# Patient Record
Sex: MALE | Race: WHITE
[De-identification: names, ages, dates, MRNs, and addresses within clinical notes are randomized per-mention and may not be internally consistent; named-entity substitution may affect disease eponyms.]

---

## 2017-01-19 NOTE — EMERGENCY ROOM VISIT NOTE
History


First contact with patient:  19:23


Chief Complaint:  CHEST PAIN


Stated Complaint:  CHEST PAIN


Nursing Triage Summary:  


patient brought in by ems 





patient reports chest pain into left arm at rest started less than 2 hours ago





patient has hx of SVT in august 2016 with ablation





History of Present Illness


The patient is a 32 year old male who presents to the Emergency Room via ALS 

with complaints of left-sided chest pain with radiation into the left arm.  The 

patient reports that the pain started suddenly prior to arrival when he was 

playing with his nieces.  He states the pain is located in the left side of the 

chest and radiates into the left arm.  It was very severe at onset, but has 

slightly improved.  He reports he has had some palpitations throughout the day.

  He has a history of SVT and had an ablation done at [facility] 

earlier this year.  He reports these are similar symptoms as to when he had the 

first episode of SVT.  He follows up with Dr. Gonzalez locally.  He denies any 

shortness of breath, nausea, vomiting, jaw pain or abdominal pain.





Review of Systems


A complete 10-point Review of Systems was discussed with the patient, with 

pertinent positives and negatives listed in the History of Present Illness. All 

remaining Review of Systems questions can be considered negative unless 

otherwise specified.





Past Medical/Surgical History


Medical Problems:


(1) ADHD (attention deficit hyperactivity disorder)


(2) Chest pain


(3) GERD (gastroesophageal reflux disease)


(4) Hx of supraventricular tachycardia


Surgical Problems:


(1) History of esophagogastroduodenoscopy (EGD)


(2) S/P ablation operation for arrhythmia








Family History





Cancer


Diabetes mellitus


Heart disease


Kidney disease


Kidney stones





Social History


Smoking Status:  Former Smoker


Drug Use:  none


Marital Status:  single


Housing Status:  lives alone


Occupation Status:  employed





Current/Historical Medications


Scheduled


Metoprolol Succinate (Toprol Xl), 50 MG PO DAILY





Allergies


Coded Allergies:  


     Iodinated Diagnostic Agents (Verified  Allergy, Unknown, RASH, 8/27/16)





Physical Exam


Vital Signs











  Date Time  Temp Pulse Resp B/P Pulse Ox O2 Delivery O2 Flow Rate FiO2


 


1/19/17 23:23  91 18 145/103 97   


 


1/19/17 23:06  98      


 


1/19/17 21:12  82 18 128/96 97 Room Air  


 


1/19/17 19:15  90      


 


1/19/17 19:11 36.3 93 20 135/90 93 Room Air  


 


1/19/17 19:11     93 Room Air  











Physical Exam


VITALS: Vitals are noted on the nurse's note and reviewed by myself.  Vital 

signs stable.


GENERAL: This is a 32-year-old male, in no acute distress, nondiaphoretic, well-

developed well-nourished.


SKIN: Capillary reflex less than 2 seconds.


HEART: Regular rate and rhythm without murmurs gallops or rubs.


LUNGS: Clear to auscultation bilaterally without wheezes, rales or rhonchi.  No 

retractions or accessory muscle use.


CHEST: Minimal reproducible pain to palpation of the sternal area.


NEURO: Patient was alert and oriented to person place and time.





Medical Decision & Procedures


ER Provider


Diagnostic Interpretation:


CHEST ONE VIEW PORTABLE





CLINICAL HISTORY: Chest pain.    





COMPARISON STUDY:  Chest radiograph September 26, 2016 per





FINDINGS: Lung volumes are at the lower limits of normal. There is no


pneumothorax or pleural effusion. Cardiomediastinal silhouette is stable. There


is no evidence of pulmonary edema. No consolidation is identified. Cardiac size


is at the upper limits of normal. 





IMPRESSION:  No acute cardiopulmonary findings.





Laboratory Results


1/19/17 19:34








Red Blood Count 5.01, Mean Corpuscular Volume 84.2, Mean Corpuscular Hemoglobin 

30.7, Mean Corpuscular Hemoglobin Concent 36.5, Mean Platelet Volume 10.6, 

Neutrophils (%) (Auto) 65.7, Lymphocytes (%) (Auto) 20.9, Monocytes (%) (Auto) 

12.2, Eosinophils (%) (Auto) 0.7, Basophils (%) (Auto) 0.4, Neutrophils # (Auto

) 5.35, Lymphocytes # (Auto) 1.70, Monocytes # (Auto) 0.99, Eosinophils # (Auto

) 0.06, Basophils # (Auto) 0.03





1/19/17 19:34

















Test


  1/19/17


19:34 1/19/17


22:48


 


White Blood Count


  8.14 K/uL


(4.8-10.8) 


 


 


Red Blood Count


  5.01 M/uL


(4.7-6.1) 


 


 


Hemoglobin


  15.4 g/dL


(14.0-18.0) 


 


 


Hematocrit 42.2 % (42-52)  


 


Mean Corpuscular Volume


  84.2 fL


() 


 


 


Mean Corpuscular Hemoglobin


  30.7 pg


(25-34) 


 


 


Mean Corpuscular Hemoglobin


Concent 36.5 g/dl


(32-36) 


 


 


Platelet Count


  211 K/uL


(130-400) 


 


 


Mean Platelet Volume


  10.6 fL


(7.4-10.4) 


 


 


Neutrophils (%) (Auto) 65.7 %  


 


Lymphocytes (%) (Auto) 20.9 %  


 


Monocytes (%) (Auto) 12.2 %  


 


Eosinophils (%) (Auto) 0.7 %  


 


Basophils (%) (Auto) 0.4 %  


 


Neutrophils # (Auto)


  5.35 K/uL


(1.4-6.5) 


 


 


Lymphocytes # (Auto)


  1.70 K/uL


(1.2-3.4) 


 


 


Monocytes # (Auto)


  0.99 K/uL


(0.11-0.59) 


 


 


Eosinophils # (Auto)


  0.06 K/uL


(0-0.5) 


 


 


Basophils # (Auto)


  0.03 K/uL


(0-0.2) 


 


 


RDW Standard Deviation


  37.3 fL


(36.4-46.3) 


 


 


RDW Coefficient of Variation


  12.3 %


(11.5-14.5) 


 


 


Immature Granulocyte % (Auto) 0.1 %  


 


Immature Granulocyte # (Auto)


  0.01 K/uL


(0.00-0.02) 


 


 


Anion Gap


  10.0 mmol/L


(3-11) 


 


 


Est Creatinine Clear Calc


Drug Dose 112.6 ml/min 


  


 


 


Estimated GFR (


American) 92.8 


  


 


 


Estimated GFR (Non-


American 80.1 


  


 


 


BUN/Creatinine Ratio 10.9 (10-20)  


 


Calcium Level


  9.2 mg/dl


(8.5-10.1) 


 


 


Total Bilirubin


  0.3 mg/dl


(0.2-1) 


 


 


Aspartate Amino Transf


(AST/SGOT) 23 U/L (15-37) 


  


 


 


Alanine Aminotransferase


(ALT/SGPT) 30 U/L (12-78) 


  


 


 


Alkaline Phosphatase


  129 U/L


() 


 


 


Total Creatine Kinase


  74 U/L


() 


 


 


Creatine Kinase MB


  < 0.5 ng/ml


(0.5-3.6) 


 


 


Creatine Kinase MB Ratio  (0-3.0)  


 


Total Protein


  7.0 gm/dl


(6.4-8.2) 


 


 


Albumin


  3.7 gm/dl


(3.4-5.0) 


 


 


Globulin


  3.3 gm/dl


(2.5-4.0) 


 


 


Albumin/Globulin Ratio 1.1 (0.9-2)  


 


Bedside Troponin I


  


  0.030 ng/ml


(0-0.045)











ECG


Rate (beats per minute):  84


Rhythm:  normal sinus


Findings:  no acute ischemic change, no ectopy





Medical Decision


Differential diagnosis includes acute coronary syndrome, pulmonary embolism, 

pneumothorax, pericarditis, myocarditis, endocarditis, anxiety, musculoskeletal 

pain, GERD, costochondritis, among others.





The patient was evaluated as above.  Labs were drawn and IV access was 

obtained.  Imaging studies were performed and read by radiology as above. The 

patient was reassessed multiple times during their stay in the emergency 

department and remained in stable condition.





The patient is a 32-year-old male who presents today complaining of chest pain 

with radiation into the left arm.  Previous records were reviewed.  Labs 

revealed no leukocytosis, anemia or concerning electrolyte abnormalities.  

Initial troponin was 0.020.  90 minute repeat troponin was performed and was 

found to be 0.030.  Troponin was again repeated 90 minutes after this and was 

unchanged.  EKG was not suggestive of any acute ischemic changes.  The patient 

was reevaluated and his symptoms had completely resolved.  Additionally, the 

patient did have some reproducible pain on examination.  I discussed the case 

with the patient's cardiologist, Dr. Gonzalez, who felt that if the patient was 

asymptomatic at this time, he was safe to be discharged home to follow-up as an 

outpatient.  The patient is a stress test one month ago which was negative.  

The patient is aware that if his symptoms return or worsen he should return to 

the emergency Department immediately.  Otherwise, he will call his cardiologist 

to arrange follow-up.





Based on the patient's presentation, lab results, and imaging studies, I feel 

the patient is stable for outpatient treatment.  The patient's case was 

reviewed with Dr. Dillard, ED attending physician, who agreed with my 

assessment and treatment plan. Discharge instructions were reviewed with the 

patient.  The patient verbalized understanding of my assessment and treatment 

plan and was discharged home in good condition.





Impression





 Primary Impression:  


 Precordial chest pain





Departure Information


Dispostion


Home / Self-Care





Condition


GOOD





Referrals


Aries Chiu M.D.([facility]) (PCP)





Patient Instructions


My [facility]





Additional Instructions





You have been treated in the Emergency Department for your Chest Pain. 

Laboratory results and Imaging Studies have ruled out any cardiac or pulmonary 

cause of your chest pain.





For pain control, you can use the following over-the-counter medicines (if >11 yo):





- Regular strength (325mg/tab) Tylenol (acetaminophen) 2 tabs every 4-6 hours 

as needed. Do not exceed 12 tablets in a 24 hour period. Avoid taking more than 

4 grams (4000 mg) of Tylenol per day. This includes any other sources of 

acetaminophen you may take on a regular basis.





- Regular strength (200 mg/tab) Advil (ibuprofen) 1-2 tabs every 4-6 hours as 

needed. Do not exceed a dose of 3200 mg per day.





You should schedule a follow-up appointment with your Primary Care Provider in 2

-3 days for further evaluation from today's Emergency Department visit.  Call 

your cardiologist tomorrow to schedule follow-up.





Return to the Emergency Department if your current symptoms worsen despite 

treatment course outlined above, or if you develop any of the following symptoms

: worsening chest pain, associated jaw/arm pain, nausea, dizziness, shortness 

of breath, bloody cough, or fainting.

## 2017-01-19 NOTE — DIAGNOSTIC IMAGING REPORT
CHEST ONE VIEW PORTABLE



CLINICAL HISTORY: Chest pain.    



COMPARISON STUDY:  Chest radiograph September 26, 2016 per



FINDINGS: Lung volumes are at the lower limits of normal. There is no

pneumothorax or pleural effusion. Cardiomediastinal silhouette is stable. There

is no evidence of pulmonary edema. No consolidation is identified. Cardiac size

is at the upper limits of normal. 



IMPRESSION:  No acute cardiopulmonary findings. 









Electronically signed by:  Jason Ward M.D.

1/19/2017 8:03 PM



Dictated Date/Time:  1/19/2017 8:02 PM

## 2017-06-15 NOTE — EMERGENCY ROOM VISIT NOTE
History


First contact with patient:  00:31


Chief Complaint:  BITE


Stated Complaint:  INFECTED SPIDER BITE





History of Present Illness


The patient is a 32 year old male who presents to the Emergency Room with 

complaints of infected insect bite to his left lower leg.  The patient states 

that he had some itching in this area yesterday, but now he has redness and 

pain.  The patient is not diabetic and rates his discomfort a 5/10.  He is able 

to ambulate despite his symptoms.  He has not had fever or chills.  He did note 

some drainage from the area this morning, but this seems to have stopped.





Review of Systems


More than 10 systems were reviewed and otherwise negative with the exception of 

history of present illness.





Past Medical/Surgical History


Medical Problems:


(1) ADHD (attention deficit hyperactivity disorder)


(2) Chest pain


(3) GERD (gastroesophageal reflux disease)


(4) Hx of supraventricular tachycardia


Surgical Problems:


(1) History of esophagogastroduodenoscopy (EGD)


(2) S/P ablation operation for arrhythmia








Family History





Cancer


Diabetes mellitus


Heart disease


Kidney disease


Kidney stones





Social History


Smoking Status:  Former Smoker


Drug Use:  none


Marital Status:  single


Housing Status:  lives alone


Occupation Status:  employed





Current/Historical Medications


Scheduled


Cephalexin Monohydrate (Keflex), 500 MG PO TID


Metoprolol Succinate (Toprol Xl), 50 MG PO DAILY


Sulfa/Trimethoprim (Bactrim Ds 800MG/160MG), 1 TAB PO BID





Allergies


Coded Allergies:  


     Iodinated Diagnostic Agents (Verified  Allergy, Unknown, RASH, 6/15/17)





Physical Exam


Vital Signs











  Date Time  Temp Pulse Resp B/P (MAP) Pulse Ox O2 Delivery O2 Flow Rate FiO2


 


6/15/17 00:47 36.6 102 18 139/96 94   


 


6/15/17 00:23 36.6 102 18 139/96 94 Room Air  








Pain Rating (0-10):  0





Physical Exam


VITALS: Vitals are noted on the nurse's note and reviewed by myself.  Vital 

signs stable.


GENERAL: Well-developed, well-nourished, white male, who is in no acute 

distress and resting comfortably. Patient is cooperative with the examination.


HEAD: Normocephalic atraumatic.  


HEART: Regular rate and rhythm without murmurs gallops or rubs.


LUNGS: Clear to auscultation bilaterally without wheezes, rales or rhonchi.  No 

retractions or accessory muscle use.


SKIN: The skin was with an area of cellulitis measuring approximately 6 cm in 

diameter along the anterior left tibia distally.  There is no obvious abscess 

or purulent drainage for culture.  No palpable cords.  No posterior calf 

tenderness.





Medical Decision & Procedures


Medications Administered











 Medications


  (Trade)  Dose


 Ordered  Sig/Aicha


 Route  Start Time


 Stop Time Status Last Admin


Dose Admin


 


 Trimethoprim/


 Sulfamethoxazole


  (Sulfameth/


 Trimeth Ds 800/


 160MG Home Pack)  1 homepack  UD  ONCE


 PO  6/15/17 00:45


 6/15/17 00:46 DC 6/15/17 00:45


1 HOMEPACK


 


 Cephalexin


 Monohydrate


  (Keflex 500MG


 Home Pack)  1 homepack  NOW  ONCE


 PO  6/15/17 00:45


 6/15/17 00:46 DC 6/15/17 00:45


1 HOMEPACK











ED Course


Physical exam and history were performed. Nursing notes and EMR were reviewed.





Patient appears to have a left lower extremity cellulitis.  He is otherwise 

healthy and does not appear toxic.  The patient was treated with Bactrim and 

Keflex here in the department.  He will be given a continuation prescriptions 

of these medications.  He is to follow with his primary care physician with any 

ongoing or persistent symptoms.  He voiced understanding and rated his 

discomfort a 1/10 at the time of departure.





The chart was completed utilizing Dragon Speech Voice Recognition Software. 

Grammatical errors, random word insertions, pronoun errors, and incomplete 

sentences are an occasional consequence of this system due to software 

limitations, ambient noise, and hardware issues. Any formal questions or 

concerns about the content, text, or information contained within the body of 

this dictation should be directly addressed to the provider for clarification.


.





Medical Decision


Differential diagnosis:


Etiologies such as cellulitis, abscess, MRSA infection, DVT, necrotizing 

fasciitis, dermatitis, drug eruption, as well as others were entertained..





Impression





 Primary Impression:  


 Cellulitis of leg





Departure Information


Dispostion


Home / Self-Care





Condition


GOOD





Prescriptions





Cephalexin Monohydrate (Keflex) 500 Mg Cap


500 MG PO TID for 9 Days, #27 CAP


   Prov: Ronald Connors PA-C         6/15/17 


Sulfa/Trimethoprim (Bactrim Ds 800MG/160MG)  Tab


1 TAB PO BID for 9 Days, #18 TAB


   Prov: Ronald Connors PA-C         6/15/17





Referrals


Aries Chiu M.D.([facility]) (PCP)





Forms


HOME CARE DOCUMENTATION FORM,                                                 

               IMPORTANT VISIT INFORMATION





Patient Instructions


My [facility]





Additional Instructions





You were seen and evaluated today on an emergency basis only.  This is not a 

substitute for, or an effort to provide, complete comprehensive medical care.  

It is not possible to recognize and treat all injuries or illnesses in a single 

emergency department visit. For this reason it is recommended that you followup 

with your primary care physician next week for any ongoing or persistent 

symptoms.





Trimethoprim-Sulfamethoxazole(Bactrim DS): Take one pill twice daily for 10 

days for your skin infection.  All antibiotics can cause diarrhea.  If this 

occurs and you feel worse or it does not resolve in 1-2 days follow up with 

your doctor or return to the Emergency Department as this could be signs of 

serious underlying problems.  Any medication can cause an allergic reaction, 

stop the pills immediately and return to the ER for rash, hives, breathing 

difficulties, or swelling.





Cephalexin(Keflex) 500mg: Take one pill 3 times daily for 10 days for your skin 

infection.  All antibiotics can cause diarrhea.  If this occurs and you feel 

worse or it does not resolve in 1-2 days follow up with your doctor or return 

to the Emergency Department as this could be signs of serious underlying 

problems.  Any medication can cause an allergic reaction, stop the pills 

immediately and return to the ER for rash, hives, breathing difficulties, or 

swelling.





You are welcome to return to the emergency department anytime with new, 

worsening, or concerning symptoms.

## 2017-08-04 NOTE — DIAGNOSTIC IMAGING REPORT
CHEST ONE VIEW PORTABLE



CLINICAL HISTORY:  Evaluate Fever/Sepsis dyspnea



COMPARISON STUDY:  1/19/2017



FINDINGS: The bones soft tissues and hemidiaphragms are normal. The

cardiomediastinal silhouette is normal. The lungs are clear. The pulmonary

vasculature is normal. 



IMPRESSION:  Negative chest. 











The above report was generated using voice recognition software.  It may contain

grammatical, syntax or spelling errors.









Electronically signed by:  Sebastian Powell M.D.

8/4/2017 10:36 AM



Dictated Date/Time:  8/4/2017 10:36 AM

## 2017-08-04 NOTE — DIAGNOSTIC IMAGING REPORT
CT ANGIOGRAM OF THE CHEST



CLINICAL HISTORY: Atypical chest pain.



COMPARISON STUDY:  Chest x-ray dated 8/4/2017. Chest CT dated 8/26/2016.



TECHNIQUE: Following the IV administration of 93 cc of Optiray 320, CT angiogram

of the chest was performed from the upper abdomen to the thoracic inlet

utilizing the pulmonary embolus protocol. Images are reviewed in the axial,

sagittal, and coronal planes. 3-D MIPS images are created and assessed. IV

contrast was administered without complication. The patient was reportedly

premedicated in the emergency department for a reported history of contrast

allergy. A dose lowering technique was utilized adhering to the principles of

ALARA.



CT DOSE: 641.20 mGycm



FINDINGS:



Thyroid: Imaged portions of the thyroid gland are normal in size and

attenuation.



Thoracic aorta: The thoracic aorta is normal in caliber and demonstrates

standard 3-vessel arch anatomy. No dissection is seen.



Pulmonary vasculature: The pulmonary trunk is normal in caliber. There are no

filling defects identified in main, lobar, or segmental pulmonary branches to

suggest pulmonary embolus.



Heart: The heart is top normal in size and without pericardial effusion.



Lungs and pleural spaces: A calcified granuloma is seen in the right lower lobe.

The lungs and pleural spaces are otherwise clear noting dependent atelectasis.

The trachea and central airways are patent.



Mediastinum: There is no mediastinal lymphadenopathy.



Radha: Clear.



Axillae: There is no axillary lymphadenopathy.



Upper abdomen: There is a tiny hiatal hernia. A 1.4 cm lesion in the right lobe

of liver is unchanged and typical appearance for a small hemangioma. Partially

visualized upper abdominal viscera is otherwise within normal limits.



Skeletal structures: There is mild thoracic scoliosis. No lytic or blastic bony

lesions are seen.





IMPRESSION:



1. There is no evidence of pulmonary embolus in the main, lobar, or segmental

pulmonary arteries. 



2. The lungs are clear.







Electronically signed by:  Jaden Castellanos M.D.

8/4/2017 1:25 PM



Dictated Date/Time:  8/4/2017 1:03 PM

## 2017-08-04 NOTE — EMERGENCY ROOM VISIT NOTE
History


Report prepared by Samina:  Mary Caicedo


Under the Supervision of:  Dr. Kvng Marsh D.O.


First contact with patient:  10:01


Chief Complaint:  CHEST PAIN


Stated Complaint:  CHEST PAIN


Nursing Triage Summary:  


Per EMS, pt. reports substernal chest pain that radiates down right arm with 


nausea that started at 0800 this morning. Pt. had an ablasion for SVT one year 


ago. Pt. initially rated pain as 10/10. After three rounds of nitro, now rates 


as 5/10.





History of Present Illness


The patient is a 32 year old male who presents to the Emergency Room with 

complaints of persistent chest pain starting 0800 today. The patient currently 

rates his discomfort as a 7/10 in severity. He describes his pain as a 

tightness. He also reports a fluttering in his chest and right arm numbness. He 

denies any swelling in the legs. He has a history of SVT. He denies any other 

medical problems. He denies any recent surgeries.





   Source of History:  patient


   Onset:  0800 today


   Position:  chest


   Symptom Intensity:  7/10


   Quality:  other (tightness)


   Timing:  other (persistent)


   Associated Symptoms:  + numbness (right arm)


Note:


Pt reports heart fluttering. Pt denies swelling in legs.





Review of Systems


See HPI for pertinent positives & negatives. A total of 10 systems reviewed and 

were otherwise negative.





Past Medical & Surgical


Medical Problems:


(1) ADHD (attention deficit hyperactivity disorder)


(2) Chest pain


(3) GERD (gastroesophageal reflux disease)


(4) Hx of supraventricular tachycardia


Surgical Problems:


(1) History of esophagogastroduodenoscopy (EGD)


(2) S/P ablation operation for arrhythmia








Family History





Cancer


Diabetes mellitus


Heart disease


Kidney disease


Kidney stones





Social History


Smoking Status:  Former Smoker


Drug Use:  none


Marital Status:  single


Housing Status:  lives alone


Occupation Status:  employed





Current/Historical Medications


Scheduled


Aripiprazole (Abilify), 5 MG PO DAILY


Metoprolol Succinate (Toprol Xl), 50 MG PO DAILY


Paroxetine Hcl (Paxil), 10 MG PO DAILY





Allergies


Coded Allergies:  


     Iodinated Diagnostic Agents (Verified  Allergy, Unknown, RASH, 6/15/17)





Physical Exam


Vital Signs











  Date Time  Temp Pulse Resp B/P (MAP) Pulse Ox O2 Delivery O2 Flow Rate FiO2


 


8/4/17 13:53  74 20 159/97 98   


 


8/4/17 13:11  89      


 


8/4/17 12:07  91 14 128/95 96 Room Air  


 


8/4/17 10:57  106 17 136/103 96 Room Air  


 


8/4/17 09:50  105      


 


8/4/17 09:49     95 Room Air  


 


8/4/17 09:49     95 Room Air  


 


8/4/17 09:49 36.9 108 23 118/77 95 Room Air  











Physical Exam


CONSTITUTIONAL/VITAL SIGNS: Reviewed / noted above.


GENERAL: Non-toxic in appearance. 


INTEGUMENTARY: Warm, dry, and Pink.


HEAD: Normocephalic.


EYES: without scleral icterus or trauma.


ENT/OROPHARYNX: clear and moist.


LYMPHADENOPATHY/NECK: Is supple without lymphadenopathy or meningismus.


RESPIRATORY: Lungs clear and equal.


CARDIOVASCULAR: Regular rate and rhythm.


GI/ABDOMEN: Soft and nontender. No organomegaly or pulsatile mass. No rebound 

or guarding. Normal bowel sounds.


EXTREMITIES: Warm and well perfused.


BACK: No CVA tenderness.


NEUROLOGICAL: Intact without focal deficits. 


PSYCHIATRIC: normal affect.


MUSCULOSKELETAL: Normally developed with good muscle tone.





Medical Decision & Procedures


ER Provider


Diagnostic Interpretation:


X ray results and stated below per my interpretation and radiology 

interpretation. Radiology results as stated below per my review and radiologist 

interpretation:





CHEST ONE VIEW PORTABLE





CLINICAL HISTORY:  Evaluate Fever/Sepsis dyspnea





COMPARISON STUDY:  1/19/2017





FINDINGS: The bones soft tissues and hemidiaphragms are normal. The


cardiomediastinal silhouette is normal. The lungs are clear. The pulmonary


vasculature is normal. 





IMPRESSION:  Negative chest. 





The above report was generated using voice recognition software.  It may contain


grammatical, syntax or spelling errors.





Electronically signed by:  Sebastian Powell M.D.


8/4/2017 10:36 AM





Dictated Date/Time:  8/4/2017 10:36 AM








CT ANGIOGRAM OF THE CHEST





CLINICAL HISTORY: Atypical chest pain.





COMPARISON STUDY:  Chest x-ray dated 8/4/2017. Chest CT dated 8/26/2016.





TECHNIQUE: Following the IV administration of 93 cc of Optiray 320, CT angiogram


of the chest was performed from the upper abdomen to the thoracic inlet


utilizing the pulmonary embolus protocol. Images are reviewed in the axial,


sagittal, and coronal planes. 3-D MIPS images are created and assessed. IV


contrast was administered without complication. The patient was reportedly


premedicated in the emergency department for a reported history of contrast


allergy. A dose lowering technique was utilized adhering to the principles of


ALARA.





CT DOSE: 641.20 mGycm





FINDINGS:





Thyroid: Imaged portions of the thyroid gland are normal in size and


attenuation.





Thoracic aorta: The thoracic aorta is normal in caliber and demonstrates


standard 3-vessel arch anatomy. No dissection is seen.





Pulmonary vasculature: The pulmonary trunk is normal in caliber. There are no


filling defects identified in main, lobar, or segmental pulmonary branches to


suggest pulmonary embolus.





Heart: The heart is top normal in size and without pericardial effusion.





Lungs and pleural spaces: A calcified granuloma is seen in the right lower lobe.


The lungs and pleural spaces are otherwise clear noting dependent atelectasis.


The trachea and central airways are patent.





Mediastinum: There is no mediastinal lymphadenopathy.





Radha: Clear.





Axillae: There is no axillary lymphadenopathy.





Upper abdomen: There is a tiny hiatal hernia. A 1.4 cm lesion in the right lobe


of liver is unchanged and typical appearance for a small hemangioma. Partially


visualized upper abdominal viscera is otherwise within normal limits.





Skeletal structures: There is mild thoracic scoliosis. No lytic or blastic bony


lesions are seen.








IMPRESSION:





1. There is no evidence of pulmonary embolus in the main, lobar, or segmental


pulmonary arteries. 





2. The lungs are clear.





Electronically signed by:  Jaden Castellanos M.D.


8/4/2017 1:25 PM





Dictated Date/Time:  8/4/2017 1:03 PM





Laboratory Results











Test


  8/4/17


10:14 8/4/17


10:15


 


Bedside D-Dimer


  > 450 ng/mlFEU


(0-450) 


 


 


Bedside Troponin I


  < 0.030 ng/ml


(0-0.045) 


 


 


Bedside Hemoglobin


  


  14.6 g/dl


(14.0-18.0)


 


Bedside Hematocrit  43 % (42-52) 


 


Bedside Sodium


  


  141 mEq/L


(135-144)


 


Bedside Potassium


  


  4.0 mEq/L


(3.3-5.0)


 


Bedside Chloride


  


  106 mEq/L


(101-112)


 


Bedside Total CO2


  


  24 mEq/l


(24-31)


 


Anion Gap


  


  17.0 mmol/L


(16-25)


 


Bedside Blood Urea Nitrogen


  


  16 mg/dl


(7-18)


 


Bedside Creatinine


  


  1.2 mg/dl


(0.6-1.3)


 


Bedside Glucose (other)


  


  108 mg/dl


(70-99)


 


Bedside Ionized Calcium (Oscar)


  


  1.24 mmol/l


(1.12-1.32)





Laboratory results as stated above per my review.





Medications Administered











 Medications


  (Trade)  Dose


 Ordered  Sig/Aicha


 Route  Start Time


 Stop Time Status Last Admin


Dose Admin


 


 Methylprednisolone


 Sodium Succinate


  (Solu-Medrol IV)  125 mg  NOW  STAT


 IV  8/4/17 11:58


 8/4/17 11:59 DC 8/4/17 12:07


125 MG


 


 Diphenhydramine


 HCl


  (Benadryl Inj)  50 mg  NOW  STAT


 IV  8/4/17 11:58


 8/4/17 11:59 DC 8/4/17 12:07


50 MG











ECG


Indication:  chest pain


Rate (beats per minute):  104


Rhythm:  sinus tachycardia


Findings:  no ectopy, other (no acute injury)





ED Course


1001: Previous medical records were reviewed. The patient was evaluated in room 

A12B. A complete history and physical examination was performed.





1158: Benadryl Inj 50 mg IV, Solu-Medrol  mg IV. 





1343: On reevaluation, the patient is resting comfortably. I discussed the 

results and findings with the patient. He verbalized agreement of the treatment 

plan. He was discharged home.





Medical Decision


the differential was considered includes acute myocardial infarction, acute 

coronary syndrome, myocarditis, pericarditis, pericardial effusions /tamponad, 

esophageal perforation, thoracic aortic dissection, pulmonary embolism, 

pneumonia, pneumothorax, pancreatitis, shingles, acute cholecystitis, 

perforated abdominal viscus.





This is a 32-year-old male who presents to the ED with a chief complaint of 

chest pain.  The patient states that it was a tightness and fluttering in his 

chest.  He reports a history of SVT.  His symptoms started around 7 AM today.  

He states that his symptoms persisted although they seem to be better now.  The 

patient's vital signs are stable.  His heart rate was 105.  His EKG shows a 

sinus tachycardia rate 104.  Chest x-ray did not show acute disease.  A d-dimer 

was elevated.  Troponin was negative.  A CT scan of the chest was negative for 

acute intra thoracic process.  The patient will be discharged.  He was told the 

results.  He is felt to be stable for discharge.





Medication Reconcilliation


Current Medication List:  was personally reviewed by me





Blood Pressure Screening


Patient's blood pressure:  Normal blood pressure


Blood pressure disposition:  Did not require urgent referral





Impression





 Primary Impression:  


 Substernal precordial chest pain





Scribe Attestation


The scribe's documentation has been prepared under my direction and personally 

reviewed by me in its entirety. I confirm that the note above accurately 

reflects all work, treatment, procedures, and medical decision making performed 

by me.





Departure Information


Dispostion


Home / Self-Care





Referrals


Aries Chiu M.D.([facility]) (PCP)





Patient Instructions


My [facility]





Additional Instructions





Test results today including a CT scan of the chest, EKG and blood work did not 

reveal any significant cause for your symptoms today.





Follow-up with your doctor for further care and evaluation in 1-2 days if 

symptoms persist.  Return to the emergency department for worsening or new 

symptoms or any concerns.


You have been examined and treated today on an emergency basis only. This is 

not a substitute for, or an effort to provide, complete comprehensive medical 

care. It is impossible to recognize and treat all injuries or illnesses in a 

single emergency department visit. It is therefore important that you follow up 

closely with your doctor.  Call as soon as possible for an appointment.

## 2018-02-22 ENCOUNTER — HOSPITAL ENCOUNTER (INPATIENT)
Dept: HOSPITAL 45 - C.EDC | Age: 33
LOS: 4 days | Discharge: HOME | DRG: 885 | End: 2018-02-26
Attending: PSYCHIATRY & NEUROLOGY | Admitting: PSYCHIATRY & NEUROLOGY
Payer: COMMERCIAL

## 2018-02-22 VITALS
BODY MASS INDEX: 35.59 KG/M2 | WEIGHT: 254.19 LBS | BODY MASS INDEX: 35.59 KG/M2 | WEIGHT: 254.19 LBS | HEIGHT: 70.98 IN | HEIGHT: 70.98 IN

## 2018-02-22 DIAGNOSIS — Z98.890: ICD-10-CM

## 2018-02-22 DIAGNOSIS — R07.89: ICD-10-CM

## 2018-02-22 DIAGNOSIS — F41.9: ICD-10-CM

## 2018-02-22 DIAGNOSIS — Z91.14: ICD-10-CM

## 2018-02-22 DIAGNOSIS — Z86.79: ICD-10-CM

## 2018-02-22 DIAGNOSIS — Z84.1: ICD-10-CM

## 2018-02-22 DIAGNOSIS — Z79.899: ICD-10-CM

## 2018-02-22 DIAGNOSIS — F90.9: ICD-10-CM

## 2018-02-22 DIAGNOSIS — Z82.49: ICD-10-CM

## 2018-02-22 DIAGNOSIS — Z83.3: ICD-10-CM

## 2018-02-22 DIAGNOSIS — Z91.041: ICD-10-CM

## 2018-02-22 DIAGNOSIS — Z87.891: ICD-10-CM

## 2018-02-22 DIAGNOSIS — F31.9: Primary | ICD-10-CM

## 2018-02-22 LAB
ALBUMIN SERPL-MCNC: 3.9 GM/DL (ref 3.4–5)
ALP SERPL-CCNC: 134 U/L (ref 45–117)
ALT SERPL-CCNC: 35 U/L (ref 12–78)
AST SERPL-CCNC: 26 U/L (ref 15–37)
BUN SERPL-MCNC: 11 MG/DL (ref 7–18)
CALCIUM SERPL-MCNC: 9.2 MG/DL (ref 8.5–10.1)
CO2 SERPL-SCNC: 26 MMOL/L (ref 21–32)
CREAT SERPL-MCNC: 1.17 MG/DL (ref 0.6–1.4)
EOSINOPHIL NFR BLD AUTO: 230 K/UL (ref 130–400)
GLUCOSE SERPL-MCNC: 86 MG/DL (ref 70–99)
HCT VFR BLD CALC: 46.2 % (ref 42–52)
HGB BLD-MCNC: 16.3 G/DL (ref 14–18)
MCH RBC QN AUTO: 30.4 PG (ref 25–34)
MCHC RBC AUTO-ENTMCNC: 35.3 G/DL (ref 32–36)
MCV RBC AUTO: 86.2 FL (ref 80–100)
PMV BLD AUTO: 10.9 FL (ref 7.4–10.4)
POTASSIUM SERPL-SCNC: 3.7 MMOL/L (ref 3.5–5.1)
PROT SERPL-MCNC: 7.9 GM/DL (ref 6.4–8.2)
RED CELL DISTRIBUTION WIDTH CV: 12.3 % (ref 11.5–14.5)
RED CELL DISTRIBUTION WIDTH SD: 39 FL (ref 36.4–46.3)
SODIUM SERPL-SCNC: 139 MMOL/L (ref 136–145)
WBC # BLD AUTO: 7.16 K/UL (ref 4.8–10.8)

## 2018-02-22 NOTE — DIAGNOSTIC IMAGING REPORT
CHEST ONE VIEW PORTABLE



CLINICAL HISTORY: CHEST PAIN    



COMPARISON STUDY:  Radiograph and chest CT August 4, 2017.



FINDINGS: Lung volumes are normal. There is no pneumothorax or pleural effusion.

There is no evidence for pulmonary edema. Cardiomediastinal silhouette is

unremarkable. There is no consolidation. The appearance of the chest is

unchanged. 



IMPRESSION:  No acute cardiopulmonary findings. 









Electronically signed by:  Jason Ward M.D.

2/22/2018 8:24 PM



Dictated Date/Time:  2/22/2018 8:23 PM
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
## 2018-02-23 VITALS — TEMPERATURE: 98.42 F | DIASTOLIC BLOOD PRESSURE: 79 MMHG | HEART RATE: 84 BPM | SYSTOLIC BLOOD PRESSURE: 115 MMHG

## 2018-02-23 VITALS — TEMPERATURE: 98.06 F | SYSTOLIC BLOOD PRESSURE: 118 MMHG | DIASTOLIC BLOOD PRESSURE: 96 MMHG | HEART RATE: 88 BPM

## 2018-02-23 VITALS — OXYGEN SATURATION: 95 %

## 2018-02-23 RX ADMIN — GABAPENTIN SCH MG: 100 CAPSULE ORAL at 13:31

## 2018-02-23 RX ADMIN — GABAPENTIN SCH MG: 100 CAPSULE ORAL at 09:08

## 2018-02-23 RX ADMIN — METHYLPHENIDATE HYDROCHLORIDE SCH MG: 10 TABLET ORAL at 15:50

## 2018-02-23 RX ADMIN — PAROXETINE SCH MG: 20 TABLET, FILM COATED ORAL at 13:09

## 2018-02-23 RX ADMIN — CLONIDINE HYDROCHLORIDE SCH MG: 0.1 TABLET ORAL at 22:08

## 2018-02-23 RX ADMIN — METOPROLOL SUCCINATE SCH MG: 50 TABLET, EXTENDED RELEASE ORAL at 09:08

## 2018-02-23 RX ADMIN — GABAPENTIN SCH MG: 100 CAPSULE ORAL at 22:08
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
## 2018-02-24 VITALS — HEART RATE: 90 BPM | SYSTOLIC BLOOD PRESSURE: 112 MMHG | TEMPERATURE: 97.7 F | DIASTOLIC BLOOD PRESSURE: 76 MMHG

## 2018-02-24 RX ADMIN — METHYLPHENIDATE HYDROCHLORIDE SCH MG: 10 TABLET ORAL at 13:42

## 2018-02-24 RX ADMIN — PAROXETINE SCH MG: 20 TABLET, FILM COATED ORAL at 08:49

## 2018-02-24 RX ADMIN — CLONIDINE HYDROCHLORIDE SCH MG: 0.1 TABLET ORAL at 21:29

## 2018-02-24 RX ADMIN — GABAPENTIN SCH MG: 100 CAPSULE ORAL at 21:29

## 2018-02-24 RX ADMIN — GABAPENTIN SCH MG: 100 CAPSULE ORAL at 13:42

## 2018-02-24 RX ADMIN — METHYLPHENIDATE HYDROCHLORIDE SCH MG: 10 TABLET ORAL at 06:41

## 2018-02-24 RX ADMIN — GABAPENTIN SCH MG: 100 CAPSULE ORAL at 08:48

## 2018-02-24 RX ADMIN — METOPROLOL SUCCINATE SCH MG: 50 TABLET, EXTENDED RELEASE ORAL at 08:49

## 2018-02-24 NOTE — PSYCHIATRIC PROGRESS NOTES
Progress Note


Date of Service


Feb 24, 2018.





Interval History


Max Landaverde is a 33-year-old male admitted on Feb 23, 2018 at 01:01 who 

currently lives in [place].  Max Landaverde was admitted on a 201 voluntary  

commitment but there is a petitioning statement from his sister with whom he 

lives.  Patient is admitted from home.  The patient was brought to the ED for 

CP following an argument with his sister.





Chief Complaint


"Oh yeah, my mood's a 10 today, I'm great!".





Subjective


Patient was seen & assessed interval progress reviewed with Nursing.  Staff 

reports the patient has a family meeting today with his sister whom he lives 

with.  Pt was restarted on home medications.  Pt was started on Ritalin while 

hospitalized due to Concerta being non-formulary, does not have prescription of 

Concerta available to be dispensed during his treatment here.  Pt was seen 

today to assess progress since admission.  Pt states he is doing "great" and 

his mood is a "10".  Pt states the only reason he came to the hospital was to 

be restarted on his home medications and he feels that goals has been met.  

Discussed with patient recommendations for a few days of observation to ensure 

his mood was remaining stable.  Pt was agreeable to this.  Pt denies SI/HI, A/V 

hallucinations, or other psychosis.  He feels he is doing well and requests 

permission to wait in his room until his sister arrives for the family meeting.

  He denies other concerns or needs today.





Review of Systems


Psych:  denies symptoms other than stated above


Constitutional: denied


Cardiovascular: denied


GI: denied


Neurologic: denied


Remainder of 10 body systems also reviewed and denied other than noted above.





Sleep Information


Total Hours of Sleep:  7.00





Meal Information


Percent of Breakfast Consumed:  100


Percent of Lunch Consumed:  100


Percent of Dinner Consumed:  100





Mental Status Exam


During interview pt is:  alert and oriented, cooperative


Appearance:  appropriately dressed, appropriately groomed


Eye contact is:  good


Motor behavior is:  steady gait & station, no abnormal motor movements


Speech:  normal in rate, rhythm & volume


Affect:  blunted


Mood is:  other ("good, my mood is a 10")


Thought process:  clear, coherent, concrete


Thought content:  reality based without delusions


Suicidal thought are:  denied


Homicidal thoughts are:  denied


Hallucinations:  denies auditory, denies visual


Cognition:  memory grossly intact, attention grossly intact, language grossly 

intact


Intelligence estimated to be:  below average


Insight:  limited


Judgement:  limited





Impression


Pt reports improvement in mood and feels that he has met his goal of being 

restarted on his home medications.  Pt is anticipating family meeting with his 

sister shortly and is feeling optimistic for discharge relatively soon.  He 

denies side effects from restarting medications, but was told that he may 

require a few more days of mood observation to ensure he was ready to go home 

as he presented with increased irritability and reactivity.  Pt was agreeable 

to this.  Pt requires ongoing inpatient hospitalization due to observation for 

medication side effects, mood changes, and still being at risk of harm to self 

or others if discharged prematurely.





Plan





(1) Bipolar disorder


The patient is admitted to [facility] ([facility] mental health unit) on q 15 

min checks (behavioral with suicide precautions) for safety.  The patient will 

participate in group, recreational and milieu therapies and will be offered 

additional individual and family sessions as clinically appropriate.  





Resume Paxil as no active evidence of taco, will address sleep with change 

from guanfacine (non-formulary) to clonidine.  If ineffective, taper clonidine 

in favor of another agent.


Will review records as patient is limited historian, presentation consistent 

with bipolar dx given irritability and hx of urias Wellbutrin, tolerating low 

dose stimulant without psychosis.





2/24


   - Continue medications as above.  Pt denies side effects. 


   - Family meeting with sister this afternoon.





(2) ADHD (attention deficit hyperactivity disorder)


patient is s/p ablation and rate controlled on Toprol, no active cardiac ds so 

will continue Concerta as effective for ADHD symptoms.





2/24


   - Pt given Ritalin as Concerta is non-formulary and he does not have home 

prescription to be dispensed while here.  Pt is agreeable to continuing Ritalin 

until discharge. 





(3) Chest pain


likely anxiety related given negative w/u in ED, continue Neurontin for anxiety








Discharge / Aftercare Planning


Primary Care Physician:  


   Name:  [facility]


   Phone Number:  [phone]


   Appointment Notes:  follow up as needed


Psychiatrist:  


   Name:  Dr. Gray


   Phone Number:  [phone]


   Date of Appointment:  Mar 6, 2018


   Time of Appointment:  10:15 a.m.


Therapist:  


   Name:  Madeleine @ Dr. Gray's office


   Phone Number:  [phone]


   Date of Appointment:  Mar 6, 2018


   Time of Appointment:  11:00 a.m.


:  


   Name:  Ankita Avery CATHY


   Phone Number:  [phone]


   Appointment Notes:  Ankita on vacation, Supervisor will call Monday and 

possibly visit





Visit Code


E&M Code:  06297





Inventory Assets


Strengths:


outpatient providers, employed


Needs:


transportation





Risk Factors Assessment


Male:  Yes


:  Yes


/single/:  Yes


Mental Health Diagnoses:  Yes


Substance use disorders:  No


Previous attempt:  No


Previous psychiatric stay:  No





Protective Factors Assessment


Employed:  Yes





Data


Vital Signs Last 24 Hrs:











  Date Time  Temp Pulse Resp B/P (MAP) Pulse Ox O2 Delivery O2 Flow Rate FiO2


 


2/24/18 06:44 36.5 80 18 116/76    





  90  112/76    








Meds Administered Last 24 Hrs:





Meds Administered (Past 24Hrs)








 Medications


  (Trade)  Dose


 Ordered  Sig/Aicha


 Route  Start Time


 Stop Time Status Last Admin


Dose Admin


 


 Metoprolol


 Succinate


  (Toprol Xl Tab)  50 mg  NOW  STAT


 PO  2/22/18 20:04


 2/22/18 20:08 DC 2/22/18 20:30


50 MG


 


 Lorazepam


  (Ativan Inj)  1 mg  NOW  STAT


 IV  2/22/18 20:04


 2/22/18 20:08 DC 2/22/18 20:31


1 MG


 


 Hydroxyzine HCl


  (Vistaril Tab)  50 mg  STK-MED ONCE


 .ROUTE  2/23/18 02:13


 2/23/18 02:14 DC 2/23/18 02:16


50 MG


 


 Gabapentin


  (Neurontin Cap)  100 mg  TID


 PO  2/23/18 09:00


 3/25/18 08:59  2/24/18 08:48


100 MG


 


 Metoprolol


 Succinate


  (Toprol Xl Tab)  50 mg  DAILY


 PO  2/23/18 09:00


 3/25/18 08:59  2/24/18 08:49


50 MG


 


 Clonidine HCl


  (Catapres Tab)  0.1 mg  HS


 PO  2/23/18 22:00


 3/25/18 21:59  2/23/18 22:08


0.1 MG


 


 Paroxetine HCl


  (pAXil TAB)  10 mg  DAILY


 PO  2/23/18 12:45


 3/25/18 12:44  2/24/18 08:49


10 MG


 


 Methylphenidate


 HCl


  (Ritalin Tab)  10 mg  [id number]


 PO  2/23/18 14:30


 3/9/18 14:29  2/24/18 06:41


10 MG











Problem Qualifiers





(1) Bipolar disorder:  


Active/Remission status:  remission status unspecified  Qualified Codes:  F31.9 

- Bipolar disorder, unspecified


(2) ADHD (attention deficit hyperactivity disorder):  


Attention deficit-hyperactivity disorder type:  unspecified  Qualified Codes:  

F90.9 - Attention-deficit hyperactivity disorder, unspecified type

## 2018-02-25 VITALS — DIASTOLIC BLOOD PRESSURE: 71 MMHG | SYSTOLIC BLOOD PRESSURE: 104 MMHG | TEMPERATURE: 98.06 F | HEART RATE: 84 BPM

## 2018-02-25 RX ADMIN — GABAPENTIN SCH MG: 100 CAPSULE ORAL at 20:41

## 2018-02-25 RX ADMIN — GABAPENTIN SCH MG: 100 CAPSULE ORAL at 07:51

## 2018-02-25 RX ADMIN — GABAPENTIN SCH MG: 100 CAPSULE ORAL at 14:12

## 2018-02-25 RX ADMIN — METHYLPHENIDATE HYDROCHLORIDE SCH MG: 10 TABLET ORAL at 14:12

## 2018-02-25 RX ADMIN — PAROXETINE SCH MG: 20 TABLET, FILM COATED ORAL at 07:51

## 2018-02-25 RX ADMIN — CLONIDINE HYDROCHLORIDE SCH MG: 0.1 TABLET ORAL at 20:41

## 2018-02-25 RX ADMIN — METOPROLOL SUCCINATE SCH MG: 50 TABLET, EXTENDED RELEASE ORAL at 07:51

## 2018-02-25 RX ADMIN — METHYLPHENIDATE HYDROCHLORIDE SCH MG: 10 TABLET ORAL at 06:57

## 2018-02-25 NOTE — PSYCHIATRIC PROGRESS NOTES
Progress Note


Date of Service


Feb 25, 2018.





Interval History


Max Landaverde is a 33-year-old male admitted on Feb 23, 2018 at 01:01 who 

currently lives in [place].  Max Landaverde was admitted on a 201 voluntary  

commitment but there is a petitioning statement from his sister with whom he 

lives.  Patient is admitted from home.  The patient was brought to the ED for 

CP following an argument with his sister.





Chief Complaint


"Really good".





Subjective


Patient was seen & assessed interval progress reviewed with Treatment Team.  

The patient says that his mood is good today.  He had a good meeting with his 

sister yesterday.  He admits that he has struggled to believe he needs medicine 

in the past, and this was part of the problem prior to admission and him not 

taking medications.  He has been restarted on medications and already says that 

he feels better.  He has worked on a safety plan, part of which is that his 

sister will help him monitor his medications and get a better pill organizer.  

He also is working toward getting Social Security disability and has an 

 to represent him.  He feels that he cannot work because he is afraid 

he will get angry and lash out at someone.  He says that this idea has been 

supported by his therapist.  He slept well last night, is eating well and is 

hopeful for discharge as soon as possible.  He denies any further suicidal 

ideation, has been attending groups, and feels he's gotten a lot out of being 

here.





Review of Systems


Constitutional:  No fever, No chills, No sweats, No weight loss, No weakness, 

No fatigue, No problem reported


ENT:  No hearing loss, No unusual epistaxis, No nasal symptoms, No sore throat, 

No tinnitus, No dental problems, No trouble swallowing, No problem reported


Respiratory:  No cough, No sputum, No wheezing, No shortness of breath, No 

dyspnea on exertion, No dyspnea at rest, No hemoptysis, No problem reported


Cardiovascular:  No chest pain, No orthopnea, No PND, No edema, No claudication

, No palpitations, No problem reported


Abdomen:  No pain, No nausea, No vomiting, No diarrhea, No constipation, No GI 

bleeding, No problem reported


Musculoskeletal:  No joint pain, No muscle pain, No swelling, No calf pain, No 

problem reported


Neurologic:  No memory loss, No paralysis, No weakness, No numbness/tingling, 

No vertigo, No balance problems, No problem reported


Psychiatric:  No depression symptoms, No anhedonism, No anxiety, No insomnia, 

No substance abuse, No problem reported


Integumentary:  No rash, No itch, No new/changing skin lesions, No color change

, No bleeding, No problem reported





Sleep Information


Total Hours of Sleep:  7.25





Meal Information


Percent of Breakfast Consumed:  100


Percent of Lunch Consumed:  100


Percent of Dinner Consumed:  100





Mental Status Exam


During interview pt is:  alert and oriented, cooperative


Appearance:  appropriately dressed, appropriately groomed


Eye contact is:  good


Motor behavior is:  steady gait & station, no abnormal motor movements


Speech:  normal in rate, rhythm & volume


Affect:  blunted


Mood is:  other ("Really good.")


Thought process:  clear, coherent, concrete


Thought content:  reality based without delusions


Suicidal thought are:  denied


Homicidal thoughts are:  denied


Hallucinations:  denies auditory, denies visual


Cognition:  memory grossly intact, attention grossly intact, language grossly 

intact


Intelligence estimated to be:  below average


Insight:  limited


Judgement:  limited





Impression


Has been able to maintain his improved mood.  Is working hard at establishing a 

good safety plan and family meeting with sister went well.  She will assist him 

to stay on his medications, something that precipitated this hospitalization.  

He is no longer suicidal, and would like to be discharged as soon as possible.





Plan





(1) Bipolar disorder


The patient is admitted to [facility] ([facility] mental health unit) on q 15 

min checks (behavioral with suicide precautions) for safety.  The patient will 

participate in group, recreational and milieu therapies and will be offered 

additional individual and family sessions as clinically appropriate.  





Resume Paxil as no active evidence of taco, will address sleep with change 

from guanfacine (non-formulary) to clonidine.  If ineffective, taper clonidine 

in favor of another agent.


Will review records as patient is limited historian, presentation consistent 

with bipolar dx given irritability and hx of urias Wellbutrin, tolerating low 

dose stimulant without psychosis.





2/24


   - Continue medications as above.  Pt denies side effects. 


   - Family meeting with sister this afternoon.


2/25


   - Continue current medications





(2) ADHD (attention deficit hyperactivity disorder)


patient is s/p ablation and rate controlled on Toprol, no active cardiac ds so 

will continue Concerta as effective for ADHD symptoms.





2/24


   - Pt given Ritalin as Concerta is non-formulary and he does not have home 

prescription to be dispensed while here.  Pt is agreeable to continuing Ritalin 

until discharge. 





(3) Chest pain


likely anxiety related given negative w/u in ED, continue Neurontin for anxiety








Discharge / Aftercare Planning


Primary Care Physician:  


   Name:  Nestor Canas


   Phone Number:  [phone]


   Appointment Notes:  follow up as needed


Psychiatrist:  


   Name:  Dr. Gray


   Phone Number:  [phone]


   Date of Appointment:  Mar 6, 2018


   Time of Appointment:  10:15 a.m.


Therapist:  


   Name:  Madeleine @ Dr. Gray's office


   Phone Number:  [phone]


   Date of Appointment:  Mar 6, 2018


   Time of Appointment:  11:00 a.m.


:  


   Name:  CATHY Gonzalez


   Phone Number:  [phone]


   Appointment Notes:  Ankita on vacation, Supervisor will call Monday and 

possibly visit





Visit Code


E&M Code:  17236





Inventory Assets


Strengths:


outpatient providers, employed


Needs:


transportation





Risk Factors Assessment


Male:  Yes


:  Yes


/single/:  Yes


Mental Health Diagnoses:  Yes


Substance use disorders:  No


Previous attempt:  No


Previous psychiatric stay:  No





Protective Factors Assessment


Employed:  Yes





Data


Vital Signs Last 24 Hrs:











  Date Time  Temp Pulse Resp B/P (MAP) Pulse Ox O2 Delivery O2 Flow Rate FiO2


 


2/25/18 06:42 36.7 74 18 106/70    





  84  104/71    








Meds Administered Last 24 Hrs:





Meds Administered (Past 24Hrs)








 Medications


  (Trade)  Dose


 Ordered  Sig/Aicha


 Route  Start Time


 Stop Time Status Last Admin


Dose Admin


 


 Gabapentin


  (Neurontin Cap)  100 mg  TID


 PO  2/23/18 09:00


 3/25/18 08:59  2/25/18 07:51


100 MG


 


 Metoprolol


 Succinate


  (Toprol Xl Tab)  50 mg  DAILY


 PO  2/23/18 09:00


 3/25/18 08:59  2/25/18 07:51


50 MG


 


 Clonidine HCl


  (Catapres Tab)  0.1 mg  HS


 PO  2/23/18 22:00


 3/25/18 21:59  2/24/18 21:29


0.1 MG


 


 Paroxetine HCl


  (pAXil TAB)  10 mg  DAILY


 PO  2/23/18 12:45


 3/25/18 12:44  2/25/18 07:51


10 MG


 


 Methylphenidate


 HCl


  (Ritalin Tab)  10 mg  [id number]


 PO  2/23/18 14:30


 3/9/18 14:29  2/25/18 06:57


10 MG








Lab Results Last 24 Hrs:


2/22/18 19:35








2/22/18 19:35

















Test


  2/22/18


19:35 2/22/18


20:18 2/22/18


20:36 2/22/18


22:34


 


Red Blood Count


  5.36 M/uL


(4.7-6.1) 


  


  


 


 


Mean Corpuscular Volume


  86.2 fL


() 


  


  


 


 


Mean Corpuscular Hemoglobin


  30.4 pg


(25-34) 


  


  


 


 


Mean Corpuscular Hemoglobin


Concent 35.3 g/dl


(32-36) 


  


  


 


 


RDW Standard Deviation


  39.0 fL


(36.4-46.3) 


  


  


 


 


RDW Coefficient of Variation


  12.3 %


(11.5-14.5) 


  


  


 


 


Mean Platelet Volume


  10.9 fL


(7.4-10.4) 


  


  


 


 


Anion Gap


  7.0 mmol/L


(3-11) 


  


  


 


 


Est Creatinine Clear Calc


Drug Dose 118.9 ml/min 


  


  


  


 


 


Estimated GFR (


American) 95.0 


  


  


  


 


 


Estimated GFR (Non-


American 82.0 


  


  


  


 


 


BUN/Creatinine Ratio 9.5 (10-20)    


 


Calcium Level


  9.2 mg/dl


(8.5-10.1) 


  


  


 


 


Total Bilirubin


  0.4 mg/dl


(0.2-1) 


  


  


 


 


Aspartate Amino Transf


(AST/SGOT) 26 U/L (15-37) 


  


  


  


 


 


Alanine Aminotransferase


(ALT/SGPT) 35 U/L (12-78) 


  


  


  


 


 


Alkaline Phosphatase


  134 U/L


() 


  


  


 


 


Total Protein


  7.9 gm/dl


(6.4-8.2) 


  


  


 


 


Albumin


  3.9 gm/dl


(3.4-5.0) 


  


  


 


 


Globulin


  4.0 gm/dl


(2.5-4.0) 


  


  


 


 


Albumin/Globulin Ratio 1.0 (0.9-2)    


 


Thyroid Stimulating Hormone


(TSH) 0.700 uIu/ml


(0.300-4.500) 


  


  


 


 


Ethyl Alcohol mg/dL


  


  < 3.0 mg/dl


(0-3) 


  


 


 


Urine Color   YELLOW  


 


Urine Appearance   CLEAR (CLEAR)  


 


Urine pH   6.0 (4.5-7.5)  


 


Urine Specific Gravity


  


  


  1.031


(1.000-1.030) 


 


 


Urine Protein   NEG (NEG)  


 


Urine Glucose (UA)   NEG (NEG)  


 


Urine Ketones   NEG (NEG)  


 


Urine Occult Blood   NEG (NEG)  


 


Urine Nitrite   NEG (NEG)  


 


Urine Bilirubin   NEG (NEG)  


 


Urine Urobilinogen   NEG (NEG)  


 


Urine Leukocyte Esterase   NEG (NEG)  


 


Urine Opiates Screen   NEG (NEG)  


 


Urine Methadone, Qualitative   NEG (NEG)  


 


Urine Barbiturates   NEG (NEG)  


 


Urine Phencyclidine (PCP)


Level 


  


  NEG (NEG) 


  


 


 


Ur


Amphetamine/Methamphetamine 


  


  NEG (NEG) 


  


 


 


MDMA (Ecstasy) Screen   NEG (NEG)  


 


Urine Benzodiazepines Screen   NEG (NEG)  


 


Urine Cocaine Metabolite   NEG (NEG)  


 


Urine Marijuana (THC)   NEG (NEG)  


 


Bedside Troponin I


  


  


  


  < 0.030 ng/ml


(0-0.045)











Problem Qualifiers





(1) Bipolar disorder:  


Active/Remission status:  remission status unspecified  Qualified Codes:  F31.9 

- Bipolar disorder, unspecified


(2) ADHD (attention deficit hyperactivity disorder):  


Attention deficit-hyperactivity disorder type:  unspecified  Qualified Codes:  

F90.9 - Attention-deficit hyperactivity disorder, unspecified type

## 2018-02-26 VITALS — DIASTOLIC BLOOD PRESSURE: 70 MMHG | TEMPERATURE: 97.52 F | SYSTOLIC BLOOD PRESSURE: 108 MMHG | HEART RATE: 71 BPM

## 2018-02-26 RX ADMIN — GABAPENTIN SCH MG: 100 CAPSULE ORAL at 08:26

## 2018-02-26 RX ADMIN — PAROXETINE SCH MG: 20 TABLET, FILM COATED ORAL at 08:26

## 2018-02-26 RX ADMIN — METHYLPHENIDATE HYDROCHLORIDE SCH MG: 10 TABLET ORAL at 06:50

## 2018-02-26 RX ADMIN — METOPROLOL SUCCINATE SCH MG: 50 TABLET, EXTENDED RELEASE ORAL at 08:26

## 2018-02-26 NOTE — DISCHARGE INSTRUCTIONS
Discharge Information


Report Includes


Report will include the:  Discharge Instructions & Summary





Admission


Admission Date / Time:  Feb 23, 2018 at 01:01


Reason for Admission:  Suicidal, 201 Bipolar





Discharge


Discharge Diagnosis / Problem:  Bipolar Disorder


Condition at Discharge:  Good





Discharge Goals


Goal(s):  Improve function, Increase independence





Activity Recommendations


Activity Limitations:  resume your previous activity





.





Instructions / Follow-Up


Instructions / Follow-Up


.





SPECIAL CARE INSTRUCTIONS:





1.  Follow through with your scheduled aftercare appointments.  If unable to 

keep an appointment, please call to reschedule.





2.  Take your medication only as prescribed.  Medication should not be changed 

or stopped without the approval of your doctor.  In the event of worsening 

symptoms or concerns about side effects, contact your doctor immediately.





3.  Utilize new healthy coping skills, anger management skills, and stress 

management skills learned during your hospitalization.  Journal feelings and 

process them with a support person.  Identify stressors or situations that may 

result in relapse, deterioration or inappropriate behaviors and develop a plan 

to deal with those issues.





4.  If your coping skills are ineffective and you are in crisis, contact your 

outpatient providers for direction.  If unable to reach your providers, please 

call the  [facility] HELP LINE AT [phone] or go to the closest Emergency Room.





5.  Avoid alcohol and un-prescribed drugs.





6.  You have been provided with the Mental Health Advance Directives Pamphlet 

for your review.








AFTERCARE APPOINTMENTS: 





*  Please call your insurance company prior to your scheduled appointment to 

confirm your aftercare providers are covered.  Take your insurance information 

to your appointments.





.





Discharge / Aftercare Planning


Primary Care Physician:  


   Name:  Kunal Canasisinger Greys Valdivia


   Phone Number:  [phone]


   Appointment Notes:  follow up as needed


Psychiatrist:  


   Name:  Dr. Gray


   Phone Number:  [phone]


   Date of Appointment:  Mar 6, 2018


   Time of Appointment:  10:15 a.m.


Therapist:  


   Name Of Therapist:  Madeleine @ Dr. Gray's office


   Phone Number:  [phone]


   Date of Appointment:  Mar 6, 2018


   Time of Appointment:  11:00 a.m.


:  


   Name:  CATHY Gonzalez


   Phone Number:  [phone]


   Date of Appointment:  Mar 8, 2018





.





Follow-Up Care


Plan for Follow-Up Care:


Patient is to attend all follow up outpatient appointments as scheduled.





Current Hospital Diet


Patient's current hospital diet: Regular Diet





Discharge Diet


Recommended Diet:  Regular Diet





Procedures


Procedures Performed:  Yes


List Procedure(s) Performed:  


CXR





Pending Studies


Pending Studies at Discharge:  No





Work Instructions


Return To Work:  2 days


Lifting Limitations:  none





Medical Emergencies








.


Who to Call and When:





Medical Emergencies:  


For questions or emergencies related to your hospital stay, please contact the 

Inpatient Behavioral Health Unit at [phone].





A psychiatric clinician is on-call 24/7 for the Behavioral Health Unit for 

emergencies





At any time you feel your situation is an emergency, you may also call 911 

immediately.





.





Non-Emergent Contact


Non-Emergency issues call your:  Primary Care Provider, Psychiatrist, Case 

Manager


Call Non-Emergent contact if:  you have any medication questions





Advance Directives


Existing Advance Directive:  No


Do You Have an Existing Mental:  No


Existing Living Will:  No


Existing Power of :  No


Advance Directives Info Given:  To Pt/S.O.


Advance Directives Reason:


Declines as Mental Health Visit.





Discharge Summary


Admission HPI


Per the Admitting provider:


The patient reportedly see Dr. Gray for bipolar disorder and ADHD and has a 

history of SVT.  His sister's car broke down so he was unable to go to  

some of his medications this month.  He ran out of Toprol XL about 2 weeks ago 

but PDMP confirms that he filled Concerta on 2/21/18.  He states he became 

upset as he was being pushed; sister says that he made statements about running 

onto the road.  He has lived with his sister, her  and their children 

for the past 2 1/2 years.  He states she often makes threatening statements 

toward him but then adds that they don't argue alot.  He states the argument 

was over how she disciplines her children.  He denies abuse but feels he should 

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  "I am a 

mandated " he proudly adds ().  





He admittedly lacks coping skills and perhaps has a history of learning 

disabilities as he doesn't drive.  He relies on his ADHD medication to help him 

stay in control around co-workers, mainly boss but feels he concentrates well 

when on it.  He states that he hasn't been sleeping well at night and sometimes 

has racing thoughts.  He reports Tenex is for sleep and that Neurontin is 

"probably for anxiety".  He denies depression per se, more irritability and 

poor frustration tolerance.  He doesn't endorse symptoms of taco.  He denies a 

history of panic and denies CP since admit to ED.





Hospital Course





(1) Bipolar disorder


The patient is admitted to [facility] ([facility] health unit) on q 15 

min checks (behavioral with suicide precautions) for safety.  The patient will 

participate in group, recreational and milieu therapies and will be offered 

additional individual and family sessions as clinically appropriate.  





Resume Paxil as no active evidence of taco, will address sleep with change 

from guanfacine (non-formulary) to clonidine.  If ineffective, taper clonidine 

in favor of another agent.


Will review records as patient is limited historian, presentation consistent 

with bipolar dx given irritability and hx of urias Wellbutrin, tolerating low 

dose stimulant without psychosis.





2/24


   - Continue medications as above.  Pt denies side effects. 


   - Family meeting with sister this afternoon.


2/25


   - Continue current medications





(2) ADHD (attention deficit hyperactivity disorder)


patient is s/p ablation and rate controlled on Toprol, no active cardiac ds so 

will continue Concerta as effective for ADHD symptoms.





2/24


   - Pt given Ritalin as Concerta is non-formulary and he does not have home 

prescription to be dispensed while here.  Pt is agreeable to continuing Ritalin 

until discharge. 





(3) Chest pain


likely anxiety related given negative w/u in ED, continue Neurontin for anxiety








Risk Factors Assessment


Male:  Yes


:  Yes


/single/:  Yes


Mental Health Diagnoses:  Yes


Substance use disorders:  No


Previous attempt:  No


Previous psychiatric stay:  No





Protective Factors Assessment


Employed:  Yes





Laboratory











Test


  2/22/18


19:35 2/22/18


20:08 2/22/18


20:18 2/22/18


20:36


 


White Blood Count 7.16    


 


Red Blood Count 5.36    


 


Hemoglobin 16.3    


 


Hematocrit 46.2    


 


Mean Corpuscular Volume 86.2    


 


Mean Corpuscular Hemoglobin 30.4    


 


Mean Corpuscular Hemoglobin


Concent 35.3 


  


  


  


 


 


RDW Standard Deviation 39.0    


 


RDW Coefficient of Variation 12.3    


 


Platelet Count 230    


 


Mean Platelet Volume 10.9    


 


Sodium Level 139    


 


Potassium Level 3.7    


 


Chloride Level 106    


 


Carbon Dioxide Level 26    


 


Anion Gap 7.0    


 


Blood Urea Nitrogen 11    


 


Creatinine 1.17    


 


Est Creatinine Clear Calc


Drug Dose 118.9 


  


  


  


 


 


Estimated GFR (


American) 95.0 


  


  


  


 


 


Estimated GFR (Non-


American 82.0 


  


  


  


 


 


BUN/Creatinine Ratio 9.5    


 


Random Glucose 86    


 


Calcium Level 9.2    


 


Total Bilirubin 0.4    


 


Aspartate Amino Transferase


(AST) 26 


  


  


  


 


 


Alanine Aminotransferase (ALT) 35    


 


Alkaline Phosphatase 134    


 


Total Protein 7.9    


 


Albumin 3.9    


 


Globulin 4.0    


 


Albumin/Globulin Ratio 1.0    


 


Thyroid Stimulating Hormone


(TSH) 0.700 


  


  


  


 


 


POC Troponin I  < 0.030   


 


Ethyl Alcohol mg/dL   < 3.0  


 


Urine Color    YELLOW 


 


Urine Appearance    CLEAR 


 


Urine pH    6.0 


 


Urine Specific Gravity    1.031 


 


Urine Protein    NEG 


 


Urine Glucose (UA)    NEG 


 


Urine Ketones    NEG 


 


Urine Occult Blood    NEG 


 


Urine Nitrite    NEG 


 


Urine Bilirubin    NEG 


 


Urine Urobilinogen    NEG 


 


Urine Leukocyte Esterase    NEG 


 


Urine Opiates Screen    NEG 


 


Urine Methadone, Qualitative    NEG 


 


Urine Barbiturates    NEG 


 


Urine Phencyclidine (PCP)


Level 


  


  


  NEG 


 


 


Ur


Amphetamine/Methamphetamine 


  


  


  NEG 


 


 


MDMA (Ecstasy) Screen    NEG 


 


Urine Benzodiazepines Screen    NEG 


 


Urine Cocaine Metabolite    NEG 


 


Urine Marijuana (THC)    NEG 


 


Test


  2/22/18


22:34 


  


  


 


 


POC Troponin I < 0.030    











Tobacco Cessation at Discharge


Smoking Status:  Former Smoker





Problem Qualifiers





(1) Bipolar disorder:  


Active/Remission status:  remission status unspecified  Qualified Codes:  F31.9 

- Bipolar disorder, unspecified


(2) ADHD (attention deficit hyperactivity disorder):  


Attention deficit-hyperactivity disorder type:  unspecified  Qualified Codes:  

F90.9 - Attention-deficit hyperactivity disorder, unspecified type

## 2018-02-26 NOTE — DISCHARGE INSTRUCTIONS
Discharge Information


Report Includes


Report will include the:  Discharge Instructions & Summary





Admission


Admission Date / Time:  Feb 23, 2018 at 01:01


Reason for Admission:  Suicidal, 201 Bipolar





Discharge


Discharge Diagnosis / Problem:  Bipolar Disorder


Condition at Discharge:  Good





Discharge Goals


Goal(s):  Improve function, Increase independence





Activity Recommendations


Activity Limitations:  resume your previous activity





.





Instructions / Follow-Up


Instructions / Follow-Up


.





SPECIAL CARE INSTRUCTIONS:





1.  Follow through with your scheduled aftercare appointments.  If unable to 

keep an appointment, please call to reschedule.





2.  Take your medication only as prescribed.  Medication should not be changed 

or stopped without the approval of your doctor.  In the event of worsening 

symptoms or concerns about side effects, contact your doctor immediately.





3.  Utilize new healthy coping skills, anger management skills, and stress 

management skills learned during your hospitalization.  Journal feelings and 

process them with a support person.  Identify stressors or situations that may 

result in relapse, deterioration or inappropriate behaviors and develop a plan 

to deal with those issues.





4.  If your coping skills are ineffective and you are in crisis, contact your 

outpatient providers for direction.  If unable to reach your providers, please 

call the  [facility] HELP LINE AT [phone] or go to the closest Emergency Room.





5.  Avoid alcohol and un-prescribed drugs.





6.  You have been provided with the Mental Health Advance Directives Pamphlet 

for your review.








AFTERCARE APPOINTMENTS: 





*  Please call your insurance company prior to your scheduled appointment to 

confirm your aftercare providers are covered.  Take your insurance information 

to your appointments.





.





Discharge / Aftercare Planning


Primary Care Physician:  


   Name:  Kunal Canasisinger Greys Valdivia


   Phone Number:  [phone]


   Appointment Notes:  follow up as needed


Psychiatrist:  


   Name:  Dr. Gray


   Phone Number:  [phone]


   Date of Appointment:  Mar 6, 2018


   Time of Appointment:  10:15 a.m.


Therapist:  


   Name Of Therapist:  Madeleine @ Dr. Gray's office


   Phone Number:  [phone]


   Date of Appointment:  Mar 6, 2018


   Time of Appointment:  11:00 a.m.


:  


   Name:  CATHY Gonzalez


   Phone Number:  [phone]


   Date of Appointment:  Mar 8, 2018





.





Follow-Up Care


Plan for Follow-Up Care:


Keep all outpatient appointments





Current Hospital Diet


Patient's current hospital diet: Regular Diet





Discharge Diet


Recommended Diet:  Regular Diet





Procedures


Procedures Performed:  Yes


List Procedure(s) Performed:  


CXR





Pending Studies


Pending Studies at Discharge:  No





Work Instructions


Return To Work:  2 days


Lifting Limitations:  none





Medical Emergencies








.


Who to Call and When:





Medical Emergencies:  


For questions or emergencies related to your hospital stay, please contact the 

Inpatient Behavioral Health Unit at [phone].





A psychiatric clinician is on-call 24/7 for the Behavioral Health Unit for 

emergencies





At any time you feel your situation is an emergency, you may also call 911 

immediately.





.





Non-Emergent Contact


Non-Emergency issues call your:  Primary Care Provider, Psychiatrist, Therapist


Call Non-Emergent contact if:  you have any medication questions





Advance Directives


Existing Advance Directive:  No


Do You Have an Existing Mental:  No


Existing Living Will:  No


Existing Power of :  No


Advance Directives Info Given:  To Pt/S.O.


Advance Directives Reason:


Declines as Mental Health Visit.





Discharge Summary


Admission HPI


Per the Admitting provider:


The patient reportedly see Dr. Gray for bipolar disorder and ADHD and has a 

history of SVT.  His sister's car broke down so he was unable to go to  

some of his medications this month.  He ran out of Toprol XL about 2 weeks ago 

but PDMP confirms that he filled Concerta on 2/21/18.  He states he became 

upset as he was being pushed; sister says that he made statements about running 

onto the road.  He has lived with his sister, her  and their children 

for the past 2 1/2 years.  He states she often makes threatening statements 

toward him but then adds that they don't argue alot.  He states the argument 

was over how she disciplines her children.  He denies abuse but feels he should 

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  "I am a 

mandated " he proudly adds ().  





He admittedly lacks coping skills and perhaps has a history of learning 

disabilities as he doesn't drive.  He relies on his ADHD medication to help him 

stay in control around co-workers, mainly boss but feels he concentrates well 

when on it.  He states that he hasn't been sleeping well at night and sometimes 

has racing thoughts.  He reports Tenex is for sleep and that Neurontin is 

"probably for anxiety".  He denies depression per se, more irritability and 

poor frustration tolerance.  He doesn't endorse symptoms of taco.  He denies a 

history of panic and denies CP since admit to ED.





Hospital Course





(1) Bipolar disorder


The patient is admitted to [facility] ([facility] mental health unit) on q 15 

min checks (behavioral with suicide precautions) for safety.  The patient will 

participate in group, recreational and milieu therapies and will be offered 

additional individual and family sessions as clinically appropriate.  





Resume Paxil as no active evidence of taco, will address sleep with change 

from guanfacine (non-formulary) to clonidine.  If ineffective, taper clonidine 

in favor of another agent.


Will review records as patient is limited historian, presentation consistent 

with bipolar dx given irritability and hx of urias Wellbutrin, tolerating low 

dose stimulant without psychosis.





2/24


   - Continue medications as above.  Pt denies side effects. 


   - Family meeting with sister this afternoon.


2/25


   - Continue current medications





(2) ADHD (attention deficit hyperactivity disorder)


patient is s/p ablation and rate controlled on Toprol, no active cardiac ds so 

will continue Concerta as effective for ADHD symptoms.





2/24


   - Pt given Ritalin as Concerta is non-formulary and he does not have home 

prescription to be dispensed while here.  Pt is agreeable to continuing Ritalin 

until discharge. 





(3) Chest pain


likely anxiety related given negative w/u in ED, continue Neurontin for anxiety








Risk Factors Assessment


Male:  Yes


:  Yes


/single/:  Yes


Mental Health Diagnoses:  Yes


Substance use disorders:  No


Previous attempt:  No


Previous psychiatric stay:  No





Protective Factors Assessment


Employed:  Yes





Day of Discharge Assessment


The patient presented as alert and cooperative.  The patient was casually 

dressed and groomed.  Eye contact was fair.  No psychomotor restlessness or 

agitation was noted.  Speech was normal in rate, rhythm, and volume.  Affect 

was mood congruent.  The patients mood appeared euthymic.  Thought processes 

were clear, coherent and goal directed without evidence of loose associations 

or flight of ideas.  Thought content/perception was reality based without 

delusions.  The patient denied suicidal and homicidal ideation.  The patient 

denied hallucinations and did not appear to be responding to internal stimuli.  

Cognition was grossly intact with orientation to person, place and time.  Fund 

of Knowledge/Intelligence were consistent with level of education.  Insight and 

Judgement were limited.  





Patient was very cooperative on the unit and had a good family session with his 

sister. He has agreed to take his medications consistently. He was able to 

verbalize a safety plan.





Laboratory











Test


  2/22/18


19:35 2/22/18


20:08 2/22/18


20:18 2/22/18


20:36


 


White Blood Count 7.16    


 


Red Blood Count 5.36    


 


Hemoglobin 16.3    


 


Hematocrit 46.2    


 


Mean Corpuscular Volume 86.2    


 


Mean Corpuscular Hemoglobin 30.4    


 


Mean Corpuscular Hemoglobin


Concent 35.3 


  


  


  


 


 


RDW Standard Deviation 39.0    


 


RDW Coefficient of Variation 12.3    


 


Platelet Count 230    


 


Mean Platelet Volume 10.9    


 


Sodium Level 139    


 


Potassium Level 3.7    


 


Chloride Level 106    


 


Carbon Dioxide Level 26    


 


Anion Gap 7.0    


 


Blood Urea Nitrogen 11    


 


Creatinine 1.17    


 


Est Creatinine Clear Calc


Drug Dose 118.9 


  


  


  


 


 


Estimated GFR (


American) 95.0 


  


  


  


 


 


Estimated GFR (Non-


American 82.0 


  


  


  


 


 


BUN/Creatinine Ratio 9.5    


 


Random Glucose 86    


 


Calcium Level 9.2    


 


Total Bilirubin 0.4    


 


Aspartate Amino Transferase


(AST) 26 


  


  


  


 


 


Alanine Aminotransferase (ALT) 35    


 


Alkaline Phosphatase 134    


 


Total Protein 7.9    


 


Albumin 3.9    


 


Globulin 4.0    


 


Albumin/Globulin Ratio 1.0    


 


Thyroid Stimulating Hormone


(TSH) 0.700 


  


  


  


 


 


POC Troponin I  < 0.030   


 


Ethyl Alcohol mg/dL   < 3.0  


 


Urine Color    YELLOW 


 


Urine Appearance    CLEAR 


 


Urine pH    6.0 


 


Urine Specific Gravity    1.031 


 


Urine Protein    NEG 


 


Urine Glucose (UA)    NEG 


 


Urine Ketones    NEG 


 


Urine Occult Blood    NEG 


 


Urine Nitrite    NEG 


 


Urine Bilirubin    NEG 


 


Urine Urobilinogen    NEG 


 


Urine Leukocyte Esterase    NEG 


 


Urine Opiates Screen    NEG 


 


Urine Methadone, Qualitative    NEG 


 


Urine Barbiturates    NEG 


 


Urine Phencyclidine (PCP)


Level 


  


  


  NEG 


 


 


Ur


Amphetamine/Methamphetamine 


  


  


  NEG 


 


 


MDMA (Ecstasy) Screen    NEG 


 


Urine Benzodiazepines Screen    NEG 


 


Urine Cocaine Metabolite    NEG 


 


Urine Marijuana (THC)    NEG 


 


Test


  2/22/18


22:34 


  


  


 


 


POC Troponin I < 0.030    











Total Time


Total Time Spent (min):  Greater than 30 minutes


Total Time Included:  examination of the patient, discharge planning, 

medication reconciliation





Tobacco Cessation at Discharge


Smoking Status:  Former Smoker


FDA approved Prescription:  non-smoker





Problem Qualifiers





(1) Bipolar disorder:  


Active/Remission status:  remission status unspecified  Qualified Codes:  F31.9 

- Bipolar disorder, unspecified


(2) ADHD (attention deficit hyperactivity disorder):  


Attention deficit-hyperactivity disorder type:  unspecified  Qualified Codes:  

F90.9 - Attention-deficit hyperactivity disorder, unspecified type

## 2018-02-26 NOTE — PSYCH MANAGEMENT PROGRESS NOTE
Psychiatry Miscellaneous





Date of Service:  Feb 26, 2018.


Patient seen, MS assessed.  Rates mood as improved.  Encouraged cooperation 

with outpatient plan.  Verbalized safety plan.  I personally participated in 

the medical decision making surrounding his discharge.